# Patient Record
Sex: FEMALE | Race: WHITE | RURAL
[De-identification: names, ages, dates, MRNs, and addresses within clinical notes are randomized per-mention and may not be internally consistent; named-entity substitution may affect disease eponyms.]

---

## 2020-03-09 ENCOUNTER — APPOINTMENT (RX ONLY)
Dept: RURAL CLINIC 1 | Facility: CLINIC | Age: 65
Setting detail: DERMATOLOGY
End: 2020-03-09

## 2020-03-09 DIAGNOSIS — L50.1 IDIOPATHIC URTICARIA: ICD-10-CM

## 2020-03-09 PROCEDURE — ? COUNSELING

## 2020-03-09 PROCEDURE — ? TREATMENT REGIMEN

## 2020-03-09 PROCEDURE — ? PRESCRIPTION

## 2020-03-09 PROCEDURE — 99202 OFFICE O/P NEW SF 15 MIN: CPT

## 2020-03-09 PROCEDURE — ? OTHER

## 2020-03-09 ASSESSMENT — LOCATION SIMPLE DESCRIPTION DERM: LOCATION SIMPLE: RIGHT UPPER BACK

## 2020-03-09 ASSESSMENT — LOCATION ZONE DERM: LOCATION ZONE: TRUNK

## 2020-03-09 ASSESSMENT — LOCATION DETAILED DESCRIPTION DERM: LOCATION DETAILED: RIGHT MEDIAL UPPER BACK

## 2020-03-09 NOTE — PROCEDURE: OTHER
Other (Free Text): Tried to help figure out the trigger today however the patient takes no medications or any over-the-counter medications. She stated the rash has been going on for about a month. She was previously prescribed hydroxyzine however she has not been taking it due to the drowsiness caused by the 25 mg dose. Called in a lower dose so that the patient can titrate and hopefully that will allow her to tolerate it better so she can take more than medicine. Discussed with the patient that if she starts a notice if there’s any itching of the throat or swelling of the throat that she should immediately report to the emergency room. Also discussing an allergist if no improvement with the antihistamine regimen.
Detail Level: Detailed
Note Text (......Xxx Chief Complaint.): This diagnosis correlates

## 2020-03-09 NOTE — PROCEDURE: TREATMENT REGIMEN
Otc Regimen: Sarna with menthol qd prn
Detail Level: Detailed
Discontinue Regimen: Hydroxyzine 25 mg. Patient states that she has not been able to tolerate due to drowsiness and his therefore not been taking it.

## 2020-03-18 ENCOUNTER — RX ONLY (OUTPATIENT)
Age: 65
Setting detail: RX ONLY
End: 2020-03-18